# Patient Record
Sex: MALE | Race: WHITE | NOT HISPANIC OR LATINO
[De-identification: names, ages, dates, MRNs, and addresses within clinical notes are randomized per-mention and may not be internally consistent; named-entity substitution may affect disease eponyms.]

---

## 2018-01-09 ENCOUNTER — APPOINTMENT (OUTPATIENT)
Dept: ORTHOPEDIC SURGERY | Facility: CLINIC | Age: 68
End: 2018-01-09

## 2020-06-25 ENCOUNTER — APPOINTMENT (OUTPATIENT)
Dept: UROLOGY | Facility: CLINIC | Age: 70
End: 2020-06-25
Payer: MEDICARE

## 2020-06-25 VITALS
DIASTOLIC BLOOD PRESSURE: 72 MMHG | WEIGHT: 315 LBS | OXYGEN SATURATION: 98 % | SYSTOLIC BLOOD PRESSURE: 162 MMHG | TEMPERATURE: 98 F | BODY MASS INDEX: 44.1 KG/M2 | HEART RATE: 81 BPM | HEIGHT: 71 IN

## 2020-06-25 DIAGNOSIS — E11.9 TYPE 2 DIABETES MELLITUS W/OUT COMPLICATIONS: ICD-10-CM

## 2020-06-25 DIAGNOSIS — Z87.898 PERSONAL HISTORY OF OTHER SPECIFIED CONDITIONS: ICD-10-CM

## 2020-06-25 DIAGNOSIS — Z87.442 PERSONAL HISTORY OF URINARY CALCULI: ICD-10-CM

## 2020-06-25 DIAGNOSIS — Z86.79 PERSONAL HISTORY OF OTHER DISEASES OF THE CIRCULATORY SYSTEM: ICD-10-CM

## 2020-06-25 DIAGNOSIS — Z87.2 PERSONAL HISTORY OF DISEASES OF THE SKIN AND SUBCUTANEOUS TISSUE: ICD-10-CM

## 2020-06-25 DIAGNOSIS — Z86.39 PERSONAL HISTORY OF OTHER ENDOCRINE, NUTRITIONAL AND METABOLIC DISEASE: ICD-10-CM

## 2020-06-25 DIAGNOSIS — Z87.891 PERSONAL HISTORY OF NICOTINE DEPENDENCE: ICD-10-CM

## 2020-06-25 PROCEDURE — 99204 OFFICE O/P NEW MOD 45 MIN: CPT

## 2020-06-25 RX ORDER — AMLODIPINE BESYLATE 5 MG/1
5 TABLET ORAL
Refills: 0 | Status: ACTIVE | COMMUNITY

## 2020-06-25 RX ORDER — GABAPENTIN 600 MG/1
600 TABLET, COATED ORAL
Refills: 0 | Status: ACTIVE | COMMUNITY

## 2020-06-25 RX ORDER — METFORMIN HYDROCHLORIDE 1000 MG/1
1000 TABLET, COATED ORAL
Refills: 0 | Status: ACTIVE | COMMUNITY

## 2020-06-25 RX ORDER — SIMVASTATIN 40 MG/1
40 TABLET, FILM COATED ORAL
Refills: 0 | Status: ACTIVE | COMMUNITY

## 2020-06-25 RX ORDER — FLUTICASONE PROPIONATE 50 UG/1
50 SPRAY, METERED NASAL
Refills: 0 | Status: ACTIVE | COMMUNITY

## 2020-06-25 RX ORDER — LOSARTAN POTASSIUM 100 MG/1
100 TABLET, FILM COATED ORAL
Refills: 0 | Status: ACTIVE | COMMUNITY

## 2020-06-25 RX ORDER — FAMOTIDINE 40 MG/1
40 TABLET, FILM COATED ORAL
Refills: 0 | Status: ACTIVE | COMMUNITY

## 2020-06-25 RX ORDER — METOPROLOL TARTRATE 25 MG/1
25 TABLET, FILM COATED ORAL
Refills: 0 | Status: ACTIVE | COMMUNITY

## 2020-06-25 RX ORDER — GLIMEPIRIDE 2 MG/1
2 TABLET ORAL
Refills: 0 | Status: ACTIVE | COMMUNITY

## 2020-06-25 RX ORDER — OMEPRAZOLE 40 MG/1
40 CAPSULE, DELAYED RELEASE ORAL
Refills: 0 | Status: ACTIVE | COMMUNITY

## 2020-06-25 RX ORDER — CLOPIDOGREL BISULFATE 75 MG/1
75 TABLET, FILM COATED ORAL
Refills: 0 | Status: ACTIVE | COMMUNITY

## 2020-06-25 RX ORDER — HYDROCHLOROTHIAZIDE 25 MG/1
25 TABLET ORAL
Refills: 0 | Status: ACTIVE | COMMUNITY

## 2020-06-25 NOTE — HISTORY OF PRESENT ILLNESS
[FreeTextEntry1] : He is a 69-year-old man who is seen today for initial visit. He has long-standing history of urological issues. Recently, he noticed painless gross hematuria on and off which has stopped. There was no flank pain or dysuria. He quit smoking more than 40 years ago. He is on anticoagulation therapy. A similar situation happened in the last 5 years and he underwent cystoscopy. He was also told CT scan was normal. He has previous history of elevated PSA level and has undergone prostate biopsy. Also he has passed 2 kidney stones. He is on Flomax and finasteride. Nocturia is one or 2 times. Sometimes he has urinary urgency. In June 2020, urine culture was negative, creatinine 0.64. PSA level was 1.6 in December 2019. Residual urine today was minimal.\par Addendum: CT scan without contrast in the Nov 2017 showed bilateral simple renal cysts, no kidney stones, enlarged prostate. CT scan in January 2014 shows bilateral renal cysts and previously seen left UPJ stone was no longer visible. Ultrasound in August 2013 showed small left UVJ stone. Studies were done at [facility].

## 2020-06-25 NOTE — REVIEW OF SYSTEMS
[Shortness Of Breath] : shortness of breath [Feeling Tired] : feeling tired [Diarrhea] : diarrhea [Constipation] : constipation [Heartburn] : heartburn [Joint Pain] : joint pain [Dizziness] : dizziness [Joint Swelling] : joint swelling [Difficulty Walking] : difficulty walking [Limb Weakness] : limb weakness [Negative] : Heme/Lymph [FreeTextEntry3] : sinus problems [FreeTextEntry2] : hypertension [FreeTextEntry5] : no erections [FreeTextEntry4] : hematuria, nocturia, frequency

## 2020-06-25 NOTE — PHYSICAL EXAM
[General Appearance - Well Developed] : well developed [Normal Appearance] : normal appearance [General Appearance - Well Nourished] : well nourished [Well Groomed] : well groomed [General Appearance - In No Acute Distress] : no acute distress [Respiration, Rhythm And Depth] : normal respiratory rhythm and effort [Exaggerated Use Of Accessory Muscles For Inspiration] : no accessory muscle use [Abdomen Tenderness] : non-tender [Abdomen Soft] : soft [Costovertebral Angle Tenderness] : no ~M costovertebral angle tenderness [Urethral Meatus] : meatus normal [Scrotum] : the scrotum was normal [Urinary Bladder Findings] : the bladder was normal on palpation [Testes Tenderness] : no tenderness of the testes [Prostate Tenderness] : the prostate was not tender [Testes Mass (___cm)] : there were no testicular masses [No Prostate Nodules] : no prostate nodules [Prostate Enlarged] : was enlarged [Normal Station and Gait] : the gait and station were normal for the patient's age [No Focal Deficits] : no focal deficits [Oriented To Time, Place, And Person] : oriented to person, place, and time [] : no rash [Affect] : the affect was normal [Mood] : the mood was normal [Not Anxious] : not anxious [Inguinal Lymph Nodes Enlarged Bilaterally] : inguinal [FreeTextEntry1] : Obese

## 2020-06-25 NOTE — ASSESSMENT
[FreeTextEntry1] : PSA level is within normal range, he is aware of the 50% reduction of PSA on finasteride. He seems to be satisfied with urinary symptoms at this time with Flomax and finasteride. He does not have any flank pain. He has previous history of kidney stones. We discussed the difference between microscopic and gross hematuria. Potential benign and malignant urological conditions that can cause hematuria were reviewed. Workup including renal imaging with ultrasonography or CT scan, urine studies and cystoscopy were reviewed. Risks of cystoscopy were discussed. Patient was made aware that despite adequate workup, the cause for hematuria may not be discovered and continued followup is recommended. Patient's questions were answered. He will undergo CT scan. He is not sure if he wants to consider another cystoscopy at this time since the last one was very painful for him. We will discuss the results of his studies on the phone otherwise.\par \par Stew Yoon MD, FACS\par [facility]\par  of Urology\par \par [address], [address]\[place] [address]\par \par [address], [address]\[place] [address]\par \par Tel: [phone]\par Fax: [phone]

## 2020-06-25 NOTE — LETTER BODY
[Consult Letter:] : I had the pleasure of evaluating your patient, [unfilled]. [Dear  ___] : Dear  [unfilled], [Consult Closing:] : Thank you very much for allowing me to participate in the care of this patient.  If you have any questions, please do not hesitate to contact me. [FreeTextEntry1] : [facility]\par [address] \[place], [address] \par [phone] \par

## 2020-06-29 LAB — URINE CYTOLOGY: NORMAL

## 2020-07-10 ENCOUNTER — APPOINTMENT (OUTPATIENT)
Dept: UROLOGY | Facility: CLINIC | Age: 70
End: 2020-07-10
Payer: MEDICARE

## 2020-07-10 VITALS
DIASTOLIC BLOOD PRESSURE: 105 MMHG | SYSTOLIC BLOOD PRESSURE: 176 MMHG | RESPIRATION RATE: 17 BRPM | TEMPERATURE: 96.6 F | HEIGHT: 71 IN | HEART RATE: 98 BPM

## 2020-07-10 PROCEDURE — 52000 CYSTOURETHROSCOPY: CPT

## 2020-07-10 PROCEDURE — 99214 OFFICE O/P EST MOD 30 MIN: CPT | Mod: 25

## 2020-07-10 NOTE — ASSESSMENT
[FreeTextEntry1] : he quit smoking a long time ago but used to smoke 2 ppd. Cystoscopy showed a papillary but relatively flat area on the right side of the bladder (as seen on CT), suspicious of urothelial carcinoma. Urine cytology was normal. He has a very long prostate on office cystoscopy. Discussed TURBT in hospital setting in detail. he needs to be cleared to stop Plavix at least 3-4 days before the procedure. discussed risks including inability to fully resect tumor, bladder perforation, return to OR, secondary treatments, etc. Also discussed it with his wife. Will schedule in the near future at [facility]. \par \par Stew Yoon MD, FACS\par [facility]\par  of Urology\par [address] [place] [address], [address]\[place] [address]\par Tel: [phone]\par Fax: [phone]\par

## 2020-07-10 NOTE — LETTER BODY
[Dear  ___] : Dear  [unfilled], [Consult Closing:] : Thank you very much for allowing me to participate in the care of this patient.  If you have any questions, please do not hesitate to contact me. [Consult Letter:] : I had the pleasure of evaluating your patient, [unfilled]. [FreeTextEntry1] : [facility]\par [address] \[place], [address] \par [phone] \par

## 2020-07-10 NOTE — PHYSICAL EXAM
[Normal Appearance] : normal appearance [General Appearance - Well Nourished] : well nourished [General Appearance - Well Developed] : well developed [Well Groomed] : well groomed [General Appearance - In No Acute Distress] : no acute distress [Abdomen Soft] : soft [Costovertebral Angle Tenderness] : no ~M costovertebral angle tenderness [Abdomen Tenderness] : non-tender [Testes Tenderness] : no tenderness of the testes [Urethral Meatus] : meatus normal [Scrotum] : the scrotum was normal [Urinary Bladder Findings] : the bladder was normal on palpation [No Prostate Nodules] : no prostate nodules [Prostate Tenderness] : the prostate was not tender [Testes Mass (___cm)] : there were no testicular masses [Prostate Enlarged] : was enlarged [FreeTextEntry1] : MICHOACANO done 7/2020 [] : no respiratory distress [Respiration, Rhythm And Depth] : normal respiratory rhythm and effort [Affect] : the affect was normal [Exaggerated Use Of Accessory Muscles For Inspiration] : no accessory muscle use [Oriented To Time, Place, And Person] : oriented to person, place, and time [Not Anxious] : not anxious [Mood] : the mood was normal

## 2020-07-10 NOTE — HISTORY OF PRESENT ILLNESS
[FreeTextEntry1] : He is a 69-year-old man who is seen today in follow up for cystoscopy. Urine cytology was normal. CT on 6/2020 from [facility] showed right bladder wall thickening, large prostate and bilateral renal stones. He is on Plavix. \par \par Previous notes: He has long-standing history of urological issues. Recently, he noticed painless gross hematuria on and off which has stopped. There was no flank pain or dysuria. He quit smoking more than 40 years ago. He is on anticoagulation therapy. A similar situation happened in the last 5 years and he underwent cystoscopy. He was also told CT scan was normal. He has previous history of elevated PSA level and has undergone prostate biopsy. Also he has passed 2 kidney stones. He is on Flomax and finasteride. Nocturia is one or 2 times. Sometimes he has urinary urgency. In June 2020, urine culture was negative, creatinine 0.64. PSA level was 1.6 in December 2019. Residual urine was minimal.\par Addendum: CT scan without contrast in the Nov 2017 showed bilateral simple renal cysts, no kidney stones, enlarged prostate. CT scan in January 2014 shows bilateral renal cysts and previously seen left UPJ stone was no longer visible. Ultrasound in August 2013 showed small left UVJ stone. Studies were done at [facility].

## 2020-07-28 ENCOUNTER — OUTPATIENT (OUTPATIENT)
Dept: OUTPATIENT SERVICES | Facility: HOSPITAL | Age: 70
LOS: 1 days | End: 2020-07-28
Payer: COMMERCIAL

## 2020-07-28 VITALS
HEART RATE: 95 BPM | DIASTOLIC BLOOD PRESSURE: 73 MMHG | TEMPERATURE: 99 F | WEIGHT: 307.1 LBS | RESPIRATION RATE: 14 BRPM | SYSTOLIC BLOOD PRESSURE: 138 MMHG | OXYGEN SATURATION: 97 % | HEIGHT: 70 IN

## 2020-07-28 DIAGNOSIS — K22.0 ACHALASIA OF CARDIA: Chronic | ICD-10-CM

## 2020-07-28 DIAGNOSIS — R31.0 GROSS HEMATURIA: ICD-10-CM

## 2020-07-28 DIAGNOSIS — Z01.818 ENCOUNTER FOR OTHER PREPROCEDURAL EXAMINATION: ICD-10-CM

## 2020-07-28 DIAGNOSIS — Z98.52 VASECTOMY STATUS: Chronic | ICD-10-CM

## 2020-07-28 DIAGNOSIS — Z98.890 OTHER SPECIFIED POSTPROCEDURAL STATES: Chronic | ICD-10-CM

## 2020-07-28 DIAGNOSIS — K60.3 ANAL FISTULA: Chronic | ICD-10-CM

## 2020-07-28 DIAGNOSIS — G47.33 OBSTRUCTIVE SLEEP APNEA (ADULT) (PEDIATRIC): ICD-10-CM

## 2020-07-28 DIAGNOSIS — E66.9 OBESITY, UNSPECIFIED: ICD-10-CM

## 2020-07-28 DIAGNOSIS — E11.9 TYPE 2 DIABETES MELLITUS WITHOUT COMPLICATIONS: ICD-10-CM

## 2020-07-28 DIAGNOSIS — D49.4 NEOPLASM OF UNSPECIFIED BEHAVIOR OF BLADDER: ICD-10-CM

## 2020-07-28 LAB
ANION GAP SERPL CALC-SCNC: 10 MMOL/L — SIGNIFICANT CHANGE UP (ref 5–17)
BUN SERPL-MCNC: 16 MG/DL — SIGNIFICANT CHANGE UP (ref 7–23)
CALCIUM SERPL-MCNC: 9.9 MG/DL — SIGNIFICANT CHANGE UP (ref 8.4–10.5)
CHLORIDE SERPL-SCNC: 105 MMOL/L — SIGNIFICANT CHANGE UP (ref 96–108)
CO2 SERPL-SCNC: 26 MMOL/L — SIGNIFICANT CHANGE UP (ref 22–31)
CREAT SERPL-MCNC: 0.66 MG/DL — SIGNIFICANT CHANGE UP (ref 0.5–1.3)
GLUCOSE SERPL-MCNC: 139 MG/DL — HIGH (ref 70–99)
HCT VFR BLD CALC: 40.5 % — SIGNIFICANT CHANGE UP (ref 39–50)
HGB BLD-MCNC: 12.5 G/DL — LOW (ref 13–17)
MCHC RBC-ENTMCNC: 27.8 PG — SIGNIFICANT CHANGE UP (ref 27–34)
MCHC RBC-ENTMCNC: 30.9 GM/DL — LOW (ref 32–36)
MCV RBC AUTO: 90.2 FL — SIGNIFICANT CHANGE UP (ref 80–100)
NRBC # BLD: 0 /100 WBCS — SIGNIFICANT CHANGE UP (ref 0–0)
PLATELET # BLD AUTO: 297 K/UL — SIGNIFICANT CHANGE UP (ref 150–400)
POTASSIUM SERPL-MCNC: 5.1 MMOL/L — SIGNIFICANT CHANGE UP (ref 3.5–5.3)
POTASSIUM SERPL-SCNC: 5.1 MMOL/L — SIGNIFICANT CHANGE UP (ref 3.5–5.3)
RBC # BLD: 4.49 M/UL — SIGNIFICANT CHANGE UP (ref 4.2–5.8)
RBC # FLD: 14.4 % — SIGNIFICANT CHANGE UP (ref 10.3–14.5)
SODIUM SERPL-SCNC: 141 MMOL/L — SIGNIFICANT CHANGE UP (ref 135–145)
WBC # BLD: 8.25 K/UL — SIGNIFICANT CHANGE UP (ref 3.8–10.5)
WBC # FLD AUTO: 8.25 K/UL — SIGNIFICANT CHANGE UP (ref 3.8–10.5)

## 2020-07-28 PROCEDURE — 85027 COMPLETE CBC AUTOMATED: CPT

## 2020-07-28 PROCEDURE — 87086 URINE CULTURE/COLONY COUNT: CPT

## 2020-07-28 PROCEDURE — 83036 HEMOGLOBIN GLYCOSYLATED A1C: CPT

## 2020-07-28 PROCEDURE — 80048 BASIC METABOLIC PNL TOTAL CA: CPT

## 2020-07-28 PROCEDURE — G0463: CPT

## 2020-07-28 RX ORDER — GLIMEPIRIDE 1 MG
1 TABLET ORAL
Qty: 0 | Refills: 0 | DISCHARGE

## 2020-07-28 RX ORDER — CEFAZOLIN SODIUM 1 G
3000 VIAL (EA) INJECTION ONCE
Refills: 0 | Status: DISCONTINUED | OUTPATIENT
Start: 2020-08-04 | End: 2020-08-19

## 2020-07-28 NOTE — H&P PST ADULT - HISTORY OF PRESENT ILLNESS
70 y/o male with PMHx of HTN, BPH, T2DM, GERD, DESTINI (Bipap), CVA (2011) on Plavix, had few episodes of gross hematuria and went to PCP. Pt was referred 70 y/o obese male with PMHx of HTN, BPH, T2DM, GERD, DESTINI (Bipap), CVA (2011) on Plavix, had few episodes of gross hematuria and went to PCP. Pt was referred to Dr. Yoon, Diagnostic imaging revealed thickening of right lateral bladder. Presents to PST for a scheduled cystoscopy, bipolar transurethral resection of bladder tumor on 8/4/2020.     ***Of note, pt to hold Plavix 5 days pre-op per Dr. Yoon, last dose 7/29. Recommended low-dose aspirin to pt while off Plavix- pt to follow-up with PCP tomorrow to confirm pre-op plan  ***Covid swab scheduled 8/1 ([place])

## 2020-07-28 NOTE — H&P PST ADULT - NSICDXPASTSURGICALHX_GEN_ALL_CORE_FT
PAST SURGICAL HISTORY:  Achalasia of esophogus s/p POEM procedure 2/2016    Anal fistula repair    H/O colonoscopy     H/O vasectomy PAST SURGICAL HISTORY:  Achalasia of esophagus s/p POEM procedure 2/2016    Anal fistula repair    H/O colonoscopy     H/O vasectomy

## 2020-07-28 NOTE — H&P PST ADULT - OTHER CARE PROVIDERS
[facility] ([place]) Angel Vallejo [phone] [facility] ([place]) Angel Vallejo [phone], appt 7/30

## 2020-07-28 NOTE — H&P PST ADULT - NSICDXPROBLEM_GEN_ALL_CORE_FT
PROBLEM DIAGNOSES  Problem: Bladder tumor  Assessment and Plan: Scheduled cystoscopy, TURBT 8/4/2020  Pre-op instructions given to pt, labwork, UCx sent at [facility]  Covid swab scheduled 8/1  Pt to continue BP medications pre-op  Will obtain PCP, Cardiac Eval/EKG    Problem: DESTINI treated with BiPAP  Assessment and Plan: OR booking notified     Problem: T2DM (type 2 diabetes mellitus)  Assessment and Plan: Pt to hold Metformin and Glimepiride 24 hrs pre-op, last dose 8/4am  HGa1c sent at [facility]  FS DOS    Problem: Obese  Assessment and Plan: Bariatric team aware

## 2020-07-28 NOTE — H&P PST ADULT - NSICDXPASTMEDICALHX_GEN_ALL_CORE_FT
PAST MEDICAL HISTORY:  Acne rosacea     Bladder tumor     Cerebrovascular accident (CVA) 9/2011 on Plavix    Diabetic neuropathy feet    Enlarged prostate     HTN (hypertension)     DESTINI treated with BiPAP 18/14    S/P cataract surgery     T2DM (type 2 diabetes mellitus) PAST MEDICAL HISTORY:  Acne rosacea     Bladder tumor     Cerebrovascular accident (CVA) 9/2011 on Plavix    Diabetic neuropathy feet    Enlarged prostate     GERD (gastroesophageal reflux disease)     HLD (hyperlipidemia)     HTN (hypertension)     Obese BMI 44.1    DESTINI treated with BiPAP 18/14    S/P cataract surgery     T2DM (type 2 diabetes mellitus)

## 2020-07-29 LAB
A1C WITH ESTIMATED AVERAGE GLUCOSE RESULT: 6.4 % — HIGH (ref 4–5.6)
CULTURE RESULTS: SIGNIFICANT CHANGE UP
ESTIMATED AVERAGE GLUCOSE: 137 MG/DL — HIGH (ref 68–114)
SPECIMEN SOURCE: SIGNIFICANT CHANGE UP

## 2020-08-01 ENCOUNTER — APPOINTMENT (OUTPATIENT)
Dept: DISASTER EMERGENCY | Facility: CLINIC | Age: 70
End: 2020-08-01

## 2020-08-02 LAB — SARS-COV-2 N GENE NPH QL NAA+PROBE: NOT DETECTED

## 2020-08-03 ENCOUNTER — TRANSCRIPTION ENCOUNTER (OUTPATIENT)
Age: 70
End: 2020-08-03

## 2020-08-04 ENCOUNTER — APPOINTMENT (OUTPATIENT)
Dept: UROLOGY | Facility: HOSPITAL | Age: 70
End: 2020-08-04

## 2020-08-04 ENCOUNTER — RESULT REVIEW (OUTPATIENT)
Age: 70
End: 2020-08-04

## 2020-08-04 ENCOUNTER — OUTPATIENT (OUTPATIENT)
Dept: OUTPATIENT SERVICES | Facility: HOSPITAL | Age: 70
LOS: 1 days | End: 2020-08-04
Payer: COMMERCIAL

## 2020-08-04 VITALS
SYSTOLIC BLOOD PRESSURE: 115 MMHG | DIASTOLIC BLOOD PRESSURE: 66 MMHG | HEART RATE: 70 BPM | TEMPERATURE: 97 F | RESPIRATION RATE: 16 BRPM | OXYGEN SATURATION: 100 %

## 2020-08-04 VITALS
RESPIRATION RATE: 18 BRPM | HEART RATE: 82 BPM | SYSTOLIC BLOOD PRESSURE: 115 MMHG | TEMPERATURE: 98 F | OXYGEN SATURATION: 98 % | DIASTOLIC BLOOD PRESSURE: 91 MMHG | HEIGHT: 70 IN | WEIGHT: 307.1 LBS

## 2020-08-04 DIAGNOSIS — K22.0 ACHALASIA OF CARDIA: Chronic | ICD-10-CM

## 2020-08-04 DIAGNOSIS — Z98.890 OTHER SPECIFIED POSTPROCEDURAL STATES: Chronic | ICD-10-CM

## 2020-08-04 DIAGNOSIS — R31.0 GROSS HEMATURIA: ICD-10-CM

## 2020-08-04 DIAGNOSIS — Z98.52 VASECTOMY STATUS: Chronic | ICD-10-CM

## 2020-08-04 DIAGNOSIS — K60.3 ANAL FISTULA: Chronic | ICD-10-CM

## 2020-08-04 LAB — GLUCOSE BLDC GLUCOMTR-MCNC: 179 MG/DL — HIGH (ref 70–99)

## 2020-08-04 PROCEDURE — 82962 GLUCOSE BLOOD TEST: CPT

## 2020-08-04 PROCEDURE — 88305 TISSUE EXAM BY PATHOLOGIST: CPT | Mod: 26

## 2020-08-04 PROCEDURE — 88305 TISSUE EXAM BY PATHOLOGIST: CPT

## 2020-08-04 PROCEDURE — 52234 CYSTOSCOPY AND TREATMENT: CPT

## 2020-08-04 RX ORDER — CLOPIDOGREL BISULFATE 75 MG/1
1 TABLET, FILM COATED ORAL
Qty: 0 | Refills: 0 | DISCHARGE

## 2020-08-04 RX ORDER — GLIMEPIRIDE 1 MG
1 TABLET ORAL
Qty: 0 | Refills: 0 | DISCHARGE

## 2020-08-04 RX ORDER — SIMVASTATIN 20 MG/1
1 TABLET, FILM COATED ORAL
Qty: 0 | Refills: 0 | DISCHARGE

## 2020-08-04 RX ORDER — LIDOCAINE HCL 20 MG/ML
0.2 VIAL (ML) INJECTION ONCE
Refills: 0 | Status: DISCONTINUED | OUTPATIENT
Start: 2020-08-04 | End: 2020-08-04

## 2020-08-04 RX ORDER — HYDROMORPHONE HYDROCHLORIDE 2 MG/ML
0.25 INJECTION INTRAMUSCULAR; INTRAVENOUS; SUBCUTANEOUS
Refills: 0 | Status: DISCONTINUED | OUTPATIENT
Start: 2020-08-04 | End: 2020-08-04

## 2020-08-04 RX ORDER — LOSARTAN POTASSIUM 100 MG/1
1 TABLET, FILM COATED ORAL
Qty: 0 | Refills: 0 | DISCHARGE

## 2020-08-04 RX ORDER — FINASTERIDE 5 MG/1
1 TABLET, FILM COATED ORAL
Qty: 0 | Refills: 0 | DISCHARGE

## 2020-08-04 RX ORDER — HALOPERIDOL DECANOATE 100 MG/ML
1 INJECTION INTRAMUSCULAR ONCE
Refills: 0 | Status: DISCONTINUED | OUTPATIENT
Start: 2020-08-04 | End: 2020-08-19

## 2020-08-04 RX ORDER — HYDROMORPHONE HYDROCHLORIDE 2 MG/ML
0.5 INJECTION INTRAMUSCULAR; INTRAVENOUS; SUBCUTANEOUS
Refills: 0 | Status: DISCONTINUED | OUTPATIENT
Start: 2020-08-04 | End: 2020-08-04

## 2020-08-04 RX ORDER — OMEPRAZOLE 10 MG/1
1 CAPSULE, DELAYED RELEASE ORAL
Qty: 0 | Refills: 0 | DISCHARGE

## 2020-08-04 RX ORDER — ACETAMINOPHEN 500 MG
2 TABLET ORAL
Qty: 0 | Refills: 0 | DISCHARGE

## 2020-08-04 RX ORDER — FAMOTIDINE 10 MG/ML
1 INJECTION INTRAVENOUS
Qty: 0 | Refills: 0 | DISCHARGE

## 2020-08-04 RX ORDER — SODIUM CHLORIDE 9 MG/ML
3 INJECTION INTRAMUSCULAR; INTRAVENOUS; SUBCUTANEOUS EVERY 8 HOURS
Refills: 0 | Status: DISCONTINUED | OUTPATIENT
Start: 2020-08-04 | End: 2020-08-04

## 2020-08-04 RX ORDER — METOPROLOL TARTRATE 50 MG
1 TABLET ORAL
Qty: 0 | Refills: 0 | DISCHARGE

## 2020-08-04 RX ORDER — METFORMIN HYDROCHLORIDE 850 MG/1
1 TABLET ORAL
Qty: 0 | Refills: 0 | DISCHARGE

## 2020-08-04 RX ORDER — TAMSULOSIN HYDROCHLORIDE 0.4 MG/1
1 CAPSULE ORAL
Qty: 0 | Refills: 0 | DISCHARGE

## 2020-08-04 RX ORDER — GABAPENTIN 400 MG/1
2 CAPSULE ORAL
Qty: 0 | Refills: 0 | DISCHARGE

## 2020-08-04 RX ORDER — AMLODIPINE BESYLATE 2.5 MG/1
1 TABLET ORAL
Qty: 0 | Refills: 0 | DISCHARGE

## 2020-08-04 RX ORDER — CEPHALEXIN 500 MG
1 CAPSULE ORAL
Qty: 6 | Refills: 0
Start: 2020-08-04 | End: 2020-08-06

## 2020-08-04 NOTE — ASU DISCHARGE PLAN (ADULT/PEDIATRIC) - CARE PROVIDER_API CALL
Stew Yoon)  Urology  [address] [place] [address]  Phone: [phone]  Fax: [phone]  Follow Up Time:

## 2020-08-04 NOTE — ASU DISCHARGE PLAN (ADULT/PEDIATRIC) - ASU DC SPECIAL INSTRUCTIONSFT
You may take over the counter pain medicine as needed for pain.    Please complete course of antibiotics as prescribed.     You may have intermittent blood tinged urine. This is normal and due to the procedure. If your urine becomes bright red or with clots, please call the office.     .Please followup with Dr Yoon on Friday for escobar removal. His office will call you to confirm an appointment time.

## 2020-08-04 NOTE — ASU DISCHARGE PLAN (ADULT/PEDIATRIC) - CALL YOUR DOCTOR IF YOU HAVE ANY OF THE FOLLOWING:
Unable to urinate/Pain not relieved by Medications/Fever greater than (need to indicate Fahrenheit or Celsius)/Bleeding that does not stop

## 2020-08-05 PROBLEM — N40.0 BENIGN PROSTATIC HYPERPLASIA WITHOUT LOWER URINARY TRACT SYMPTOMS: Chronic | Status: ACTIVE | Noted: 2020-07-28

## 2020-08-05 PROBLEM — E78.5 HYPERLIPIDEMIA, UNSPECIFIED: Chronic | Status: ACTIVE | Noted: 2020-07-28

## 2020-08-05 PROBLEM — E11.40 TYPE 2 DIABETES MELLITUS WITH DIABETIC NEUROPATHY, UNSPECIFIED: Chronic | Status: ACTIVE | Noted: 2020-07-28

## 2020-08-05 PROBLEM — L71.9 ROSACEA, UNSPECIFIED: Chronic | Status: ACTIVE | Noted: 2020-07-28

## 2020-08-05 PROBLEM — K21.9 GASTRO-ESOPHAGEAL REFLUX DISEASE WITHOUT ESOPHAGITIS: Chronic | Status: ACTIVE | Noted: 2020-07-28

## 2020-08-05 PROBLEM — Z98.49 CATARACT EXTRACTION STATUS, UNSPECIFIED EYE: Chronic | Status: ACTIVE | Noted: 2020-07-28

## 2020-08-05 PROBLEM — I10 ESSENTIAL (PRIMARY) HYPERTENSION: Chronic | Status: ACTIVE | Noted: 2020-07-28

## 2020-08-05 PROBLEM — E66.9 OBESITY, UNSPECIFIED: Chronic | Status: ACTIVE | Noted: 2020-07-28

## 2020-08-05 PROBLEM — G47.33 OBSTRUCTIVE SLEEP APNEA (ADULT) (PEDIATRIC): Chronic | Status: ACTIVE | Noted: 2020-07-28

## 2020-08-05 PROBLEM — D49.4 NEOPLASM OF UNSPECIFIED BEHAVIOR OF BLADDER: Chronic | Status: ACTIVE | Noted: 2020-07-28

## 2020-08-05 PROBLEM — E11.9 TYPE 2 DIABETES MELLITUS WITHOUT COMPLICATIONS: Chronic | Status: ACTIVE | Noted: 2020-07-28

## 2020-08-05 PROBLEM — I63.9 CEREBRAL INFARCTION, UNSPECIFIED: Chronic | Status: ACTIVE | Noted: 2020-07-28

## 2020-08-05 LAB — SURGICAL PATHOLOGY STUDY: SIGNIFICANT CHANGE UP

## 2020-08-07 ENCOUNTER — APPOINTMENT (OUTPATIENT)
Dept: UROLOGY | Facility: CLINIC | Age: 70
End: 2020-08-07
Payer: MEDICARE

## 2020-08-07 PROCEDURE — 99213 OFFICE O/P EST LOW 20 MIN: CPT

## 2020-08-07 NOTE — HISTORY OF PRESENT ILLNESS
[FreeTextEntry1] : S/P TURBT, Cysto 8/4. Feeling well. Denies fever, chills. Here today to have escobar catheter discontinued. [None] : None

## 2020-08-07 NOTE — PHYSICAL EXAM
[Normal Appearance] : normal appearance [Well Groomed] : well groomed [General Appearance - Well Developed] : well developed [General Appearance - Well Nourished] : well nourished [FreeTextEntry1] : escobar with yellow/orange urine, no hematuria or clots [General Appearance - In No Acute Distress] : no acute distress [Exaggerated Use Of Accessory Muscles For Inspiration] : no accessory muscle use [] : no respiratory distress [Respiration, Rhythm And Depth] : normal respiratory rhythm and effort [Skin Color & Pigmentation] : normal skin color and pigmentation [Oriented To Time, Place, And Person] : oriented to person, place, and time [Mood] : the mood was normal [Affect] : the affect was normal [Normal Station and Gait] : the gait and station were normal for the patient's age [Not Anxious] : not anxious

## 2020-08-07 NOTE — ASSESSMENT
[FreeTextEntry1] : Post Op\par \par S/P TURBT x 4 days\par Reyes catheter discontinued\par Instructed if difficulty voiding notify office or to ED\par As per pt next appt. is in 3 months

## 2020-08-26 ENCOUNTER — APPOINTMENT (OUTPATIENT)
Dept: UROLOGY | Facility: CLINIC | Age: 70
End: 2020-08-26
Payer: MEDICARE

## 2020-08-26 VITALS
SYSTOLIC BLOOD PRESSURE: 183 MMHG | RESPIRATION RATE: 16 BRPM | DIASTOLIC BLOOD PRESSURE: 85 MMHG | TEMPERATURE: 97.3 F | HEART RATE: 106 BPM

## 2020-08-26 PROCEDURE — 51700 IRRIGATION OF BLADDER: CPT

## 2020-09-11 ENCOUNTER — APPOINTMENT (OUTPATIENT)
Dept: UROLOGY | Facility: CLINIC | Age: 70
End: 2020-09-11
Payer: MEDICARE

## 2020-09-11 VITALS — HEIGHT: 71 IN | WEIGHT: 315 LBS | TEMPERATURE: 97.3 F | BODY MASS INDEX: 44.1 KG/M2 | RESPIRATION RATE: 17 BRPM

## 2020-09-11 PROCEDURE — 99214 OFFICE O/P EST MOD 30 MIN: CPT

## 2020-09-11 RX ORDER — TAMSULOSIN HYDROCHLORIDE 0.4 MG/1
0.4 CAPSULE ORAL
Qty: 180 | Refills: 1 | Status: ACTIVE | COMMUNITY
Start: 1900-01-01 | End: 1900-01-01

## 2020-09-11 NOTE — PHYSICAL EXAM
[General Appearance - Well Developed] : well developed [General Appearance - Well Nourished] : well nourished [Well Groomed] : well groomed [Normal Appearance] : normal appearance [General Appearance - In No Acute Distress] : no acute distress [Abdomen Tenderness] : non-tender [Abdomen Soft] : soft [Costovertebral Angle Tenderness] : no ~M costovertebral angle tenderness [Urethral Meatus] : meatus normal [Scrotum] : the scrotum was normal [Urinary Bladder Findings] : the bladder was normal on palpation [Testes Tenderness] : no tenderness of the testes [Testes Mass (___cm)] : there were no testicular masses [Prostate Tenderness] : the prostate was not tender [No Prostate Nodules] : no prostate nodules [Prostate Enlarged] : was enlarged [] : no respiratory distress [Respiration, Rhythm And Depth] : normal respiratory rhythm and effort [Exaggerated Use Of Accessory Muscles For Inspiration] : no accessory muscle use [FreeTextEntry1] : using a cane [Inguinal Lymph Nodes Enlarged Bilaterally] : inguinal

## 2020-09-11 NOTE — HISTORY OF PRESENT ILLNESS
[FreeTextEntry1] : He is a 69-year-old man who is seen today in follow-up. He underwent resection of an anterior bladder lesion on August 4, 2020. 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 About 2 weeks after the catheter was removed he developed gross hematuria and went to [facility]. He required catheterization and bladder irrigation overnight. Now catheter has been removed. He has a small amount of blood in the urine on and off. He is on Plavix. He also has history of urethral stricture and urinary stream is split. He has pain with urination. Residual urine today was minimal. CT scan on August 23, 2020 from emergency room showed bladder filling defects dependently representing blood clots, mild bilateral hydronephrosis and distended bladder, diverticulosis, gallstones. Prior to surgery urine cytology was normal. CT on 6/2020 from [facility] showed right bladder wall thickening, large prostate and bilateral renal stones.\par \par Previous notes: He has long-standing history of urological issues. He quit smoking more than 40 years ago. He has previous history of elevated PSA level and has undergone prostate biopsy. Also he has passed 2 kidney stones. He is on Flomax and finasteride. Nocturia is one or 2 times. Sometimes he has urinary urgency. In June 2020, urine culture was negative, creatinine 0.64. PSA level was 1.6 in December 2019. Residual urine was minimal.\par Addendum: CT scan without contrast in the Nov 2017 showed bilateral simple renal cysts, no kidney stones, enlarged prostate. CT scan in January 2014 shows bilateral renal cysts and previously seen left UPJ stone was no longer visible. Ultrasound in August 2013 showed small left UVJ stone. Studies were done at [facility].

## 2020-09-11 NOTE — ASSESSMENT
[FreeTextEntry1] : Residual urine is minimal. Urine will be sent for culture. Increase Flomax to twice a day. CT scan was reviewed. Also he can use Pyridium for dysuria. Pathology report was discussed in detail. He should consider close followup with repeat office cystoscopy in 3 months. If urinary symptoms worsen, he will return for self dilation and catheterization of distal urethral strictures in the office.\par \par Stew Yoon MD, FACS\par [facility]\par  of Urology\par \par [address], [address]\[place] [address]\par \par [address], [address]\[place] [address]\par \par Tel: [phone]\par Fax: [phone]

## 2020-09-11 NOTE — LETTER BODY
[Dear  ___] : Dear  [unfilled], [Consult Letter:] : I had the pleasure of evaluating your patient, [unfilled]. [Consult Closing:] : Thank you very much for allowing me to participate in the care of this patient.  If you have any questions, please do not hesitate to contact me. [FreeTextEntry1] : [facility]\par [address] \[place], [address] \par [phone] \par

## 2020-09-16 LAB — BACTERIA UR CULT: ABNORMAL

## 2020-09-16 RX ORDER — FLUCONAZOLE 150 MG/1
150 TABLET ORAL
Qty: 3 | Refills: 0 | Status: ACTIVE | COMMUNITY
Start: 2020-09-16 | End: 1900-01-01

## 2020-09-25 ENCOUNTER — APPOINTMENT (OUTPATIENT)
Dept: UROLOGY | Facility: CLINIC | Age: 70
End: 2020-09-25

## 2020-09-25 ENCOUNTER — APPOINTMENT (OUTPATIENT)
Dept: UROLOGY | Facility: CLINIC | Age: 70
End: 2020-09-25
Payer: MEDICARE

## 2020-09-25 VITALS
SYSTOLIC BLOOD PRESSURE: 178 MMHG | HEART RATE: 98 BPM | BODY MASS INDEX: 42.56 KG/M2 | WEIGHT: 304 LBS | TEMPERATURE: 97.4 F | DIASTOLIC BLOOD PRESSURE: 85 MMHG | HEIGHT: 71 IN

## 2020-09-25 LAB
BILIRUB UR QL STRIP: NEGATIVE
CLARITY UR: CLEAR
COLLECTION METHOD: NORMAL
GLUCOSE UR-MCNC: NEGATIVE
HCG UR QL: 0.2 EU/DL
HGB UR QL STRIP.AUTO: NORMAL
KETONES UR-MCNC: NEGATIVE
LEUKOCYTE ESTERASE UR QL STRIP: NORMAL
NITRITE UR QL STRIP: NEGATIVE
PH UR STRIP: 6.5
PROT UR STRIP-MCNC: NEGATIVE
SP GR UR STRIP: 1.01

## 2020-09-25 PROCEDURE — 81003 URINALYSIS AUTO W/O SCOPE: CPT | Mod: QW

## 2020-09-25 PROCEDURE — 51741 ELECTRO-UROFLOWMETRY FIRST: CPT

## 2020-09-25 PROCEDURE — 99213 OFFICE O/P EST LOW 20 MIN: CPT | Mod: 25

## 2020-09-25 RX ORDER — DOXYCYCLINE HYCLATE 100 MG/1
100 CAPSULE ORAL
Refills: 0 | Status: DISCONTINUED | COMMUNITY
End: 2020-09-25

## 2020-09-25 RX ORDER — PHENAZOPYRIDINE 200 MG/1
200 TABLET, FILM COATED ORAL
Qty: 16 | Refills: 5 | Status: ACTIVE | COMMUNITY
Start: 2020-09-11 | End: 1900-01-01

## 2020-09-25 NOTE — HISTORY OF PRESENT ILLNESS
[FreeTextEntry1] : He is a 69-year-old man who is seen today in follow-up. Urine culture in September 2020 showed Candida and he was treated with Diflucan. Urinary symptoms have improved somewhat but he still has on and off blood in the urine. Uroflow today showed maximum 11, average 6 mL per second, voided 170 cc and residual urine was 50 cc. He also has a distal urethral stricture. He underwent anterior bladder lesion resection on August 4, 2020. 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 He is on finasteride and Flomax twice a day. Pyridium helped with dysuria.\par \par About 2 weeks after the catheter was removed he developed gross hematuria and went to [facility]. He required catheterization and bladder irrigation overnight. He is on Plavix. CT scan on August 23, 2020 from emergency room showed bladder filling defects dependently representing blood clots, mild bilateral hydronephrosis and distended bladder, diverticulosis, gallstones. Prior to surgery urine cytology was normal. CT on 6/2020 from [facility] showed right bladder wall thickening, large prostate and bilateral renal stones.\par \par Previous notes: He has long-standing history of urological issues. He quit smoking more than 40 years ago. He has previous history of elevated PSA level and has undergone prostate biopsy. Also he has passed 2 kidney stones. He is on Flomax and finasteride. In June 2020, urine culture was negative, creatinine 0.64. PSA level was 1.6 in December 2019. \par Addendum: CT scan without contrast in the Nov 2017 showed bilateral simple renal cysts, no kidney stones, enlarged prostate. CT scan in January 2014 shows bilateral renal cysts and previously seen left UPJ stone was no longer visible. Ultrasound in August 2013 showed small left UVJ stone. Studies were done at [facility].

## 2020-09-25 NOTE — PHYSICAL EXAM
[General Appearance - Well Developed] : well developed [General Appearance - Well Nourished] : well nourished [Normal Appearance] : normal appearance [Well Groomed] : well groomed [General Appearance - In No Acute Distress] : no acute distress [Abdomen Soft] : soft [Abdomen Tenderness] : non-tender [Costovertebral Angle Tenderness] : no ~M costovertebral angle tenderness [Urethral Meatus] : meatus normal [Urinary Bladder Findings] : the bladder was normal on palpation [Scrotum] : the scrotum was normal [Testes Tenderness] : no tenderness of the testes [Testes Mass (___cm)] : there were no testicular masses [Prostate Tenderness] : the prostate was not tender [No Prostate Nodules] : no prostate nodules [Prostate Enlarged] : was enlarged [FreeTextEntry1] : MICHOACANO done 7/2020. [] : no respiratory distress [Respiration, Rhythm And Depth] : normal respiratory rhythm and effort [Exaggerated Use Of Accessory Muscles For Inspiration] : no accessory muscle use [Oriented To Time, Place, And Person] : oriented to person, place, and time [Affect] : the affect was normal [Mood] : the mood was normal [Not Anxious] : not anxious

## 2020-09-25 NOTE — ASSESSMENT
[FreeTextEntry1] : Urine culture will be repeated. Residual urine was acceptable. In the office with a 14 Wolof catheter distal urethral stricture was dilated. Uroflow was done after he was dilated. He will followup in 3 months for repeat cystoscopy unless his symptoms worsen.\par \par Stew Yoon MD, FACS\par [facility]\par  of Urology\par \par [address], [address]\[place] [address]\par \par [address]\[place] [address]\par \par Tel: [phone]\par Fax: [phone]

## 2020-09-25 NOTE — LETTER BODY
[Dear  ___] : Dear  [unfilled], [Consult Letter:] : I had the pleasure of evaluating your patient, [unfilled]. [Consult Closing:] : Thank you very much for allowing me to participate in the care of this patient.  If you have any questions, please do not hesitate to contact me. [FreeTextEntry1] : [facility]\par [address] \[place], [address] \par [phone] \par

## 2020-09-27 LAB — BACTERIA UR CULT: NORMAL

## 2020-10-07 ENCOUNTER — TRANSCRIPTION ENCOUNTER (OUTPATIENT)
Age: 70
End: 2020-10-07

## 2020-10-09 ENCOUNTER — APPOINTMENT (OUTPATIENT)
Dept: UROLOGY | Facility: CLINIC | Age: 70
End: 2020-10-09
Payer: MEDICARE

## 2020-10-09 DIAGNOSIS — Z87.448 PERSONAL HISTORY OF OTHER DISEASES OF URINARY SYSTEM: ICD-10-CM

## 2020-10-09 DIAGNOSIS — N40.0 BENIGN PROSTATIC HYPERPLASIA WITHOUT LOWER URINARY TRACT SYMPMS: ICD-10-CM

## 2020-10-09 PROCEDURE — 99213 OFFICE O/P EST LOW 20 MIN: CPT

## 2020-10-09 RX ORDER — FINASTERIDE 5 MG/1
5 TABLET, FILM COATED ORAL DAILY
Qty: 90 | Refills: 2 | Status: ACTIVE | COMMUNITY
Start: 1900-01-01 | End: 1900-01-01

## 2020-10-09 NOTE — PHYSICAL EXAM
[General Appearance - Well Developed] : well developed [General Appearance - Well Nourished] : well nourished [Normal Appearance] : normal appearance [Well Groomed] : well groomed [General Appearance - In No Acute Distress] : no acute distress [Abdomen Soft] : soft [Abdomen Tenderness] : non-tender [Costovertebral Angle Tenderness] : no ~M costovertebral angle tenderness [Urethral Meatus] : meatus normal [Urinary Bladder Findings] : the bladder was normal on palpation [Scrotum] : the scrotum was normal [Testes Tenderness] : no tenderness of the testes [Testes Mass (___cm)] : there were no testicular masses [Prostate Tenderness] : the prostate was not tender [No Prostate Nodules] : no prostate nodules [Prostate Enlarged] : was enlarged [FreeTextEntry1] : MICHOACANO done 7/2020. [] : no respiratory distress [Respiration, Rhythm And Depth] : normal respiratory rhythm and effort [Exaggerated Use Of Accessory Muscles For Inspiration] : no accessory muscle use

## 2020-10-09 NOTE — ASSESSMENT
[FreeTextEntry1] : Urinary symptoms have improved. Continue Flomax and finasteride. He will followup in about one month for repeat cystoscopy. Repeat urine culture was negative.\par \par Stew Yoon MD, FACS\par [facility]\par  of Urology\par \par [address]\[place] [address]\par \par [address]\[place] [address]\par \par Tel: [phone]\par Fax: [phone]

## 2020-10-09 NOTE — LETTER BODY
[Dear  ___] : Dear  [unfilled], [Consult Letter:] : I had the pleasure of evaluating your patient, [unfilled]. [Consult Closing:] : Thank you very much for allowing me to participate in the care of this patient.  If you have any questions, please do not hesitate to contact me. [FreeTextEntry1] : [facility]\par [address] \[place], [address] \par [phone] \par

## 2020-10-09 NOTE — HISTORY OF PRESENT ILLNESS
[FreeTextEntry1] : He is a 69-year-old man who is seen today in follow-up. Urinary symptoms have improved. He has used Pyridium on an off. He has not seen any blood in the urine recently. Repeat urine culture was negative. Culture in September 2020 showed Candida and he was treated with Diflucan. He is on finasteride. He has a distal urethral stricture. Uroflow showed maximum 11, average 6 mL per second, voided 170 cc and residual urine was 50 cc.  He underwent anterior bladder lesion resection on August 2020. 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 He is on finasteride and Flomax twice a day. \par \par About 2 weeks after the catheter was removed he developed gross hematuria and [place] [facility] [place] catheterization and bladder irrigation overnight. He is on Plavix. CT scan on August 23, 2020 from emergency room showed bladder filling defects dependently representing blood clots, mild bilateral hydronephrosis and distended bladder, diverticulosis, gallstones. Prior to surgery urine cytology was normal. CT on 6/2020 from [facility] showed right bladder wall thickening, large prostate and bilateral renal stones.\par \par Previous notes: He has long-standing history of urological issues. He quit smoking more than 40 years ago. He has previous history of elevated PSA level and has undergone prostate biopsy. Also he has passed 2 kidney stones. He is on Flomax and finasteride. In June 2020, urine culture was negative, creatinine 0.64. PSA level was 1.6 in December 2019. \par Addendum: CT scan without contrast in the Nov 2017 showed bilateral simple renal cysts, no kidney stones, enlarged prostate. CT scan in January 2014 shows bilateral renal cysts and previously seen left UPJ stone was no longer visible. Ultrasound in August 2013 showed small left UVJ stone. Studies were done at [facility].

## 2020-11-13 ENCOUNTER — APPOINTMENT (OUTPATIENT)
Dept: UROLOGY | Facility: CLINIC | Age: 70
End: 2020-11-13
Payer: MEDICARE

## 2020-11-13 ENCOUNTER — APPOINTMENT (OUTPATIENT)
Dept: UROLOGY | Facility: CLINIC | Age: 70
End: 2020-11-13

## 2020-11-13 VITALS
DIASTOLIC BLOOD PRESSURE: 86 MMHG | SYSTOLIC BLOOD PRESSURE: 159 MMHG | HEART RATE: 68 BPM | RESPIRATION RATE: 16 BRPM | TEMPERATURE: 97.1 F

## 2020-11-13 DIAGNOSIS — D49.4 NEOPLASM OF UNSPECIFIED BEHAVIOR OF BLADDER: ICD-10-CM

## 2020-11-13 PROCEDURE — 99072 ADDL SUPL MATRL&STAF TM PHE: CPT

## 2020-11-13 PROCEDURE — 52281 CYSTOSCOPY AND TREATMENT: CPT

## 2020-11-15 LAB — BACTERIA UR CULT: NORMAL

## 2020-11-16 LAB — URINE CYTOLOGY: NORMAL

## 2021-02-05 ENCOUNTER — APPOINTMENT (OUTPATIENT)
Dept: UROLOGY | Facility: CLINIC | Age: 71
End: 2021-02-05

## 2022-03-10 NOTE — PATIENT PROFILE ADULT - INFORMATION PROVIDED TO:
March 11, 2022      [place] SUAREZ Philip  [address] [place] [address]        Dear ,    We are writing to inform you of your test results.    Your test results fall within the expected range(s) or remain unchanged from previous results.  Please continue with current treatment plan.    Resulted Orders   GLUCOSE   Result Value Ref Range    Glucose 98 70 - 105 mg/dL    Patient Fasting > 8hrs? Unknown    Lipid Panel   Result Value Ref Range    Cholesterol 150 <200 mg/dL    Triglycerides 89 <150 mg/dL    Direct Measure HDL 42 23 - 92 mg/dL    LDL Cholesterol Calculated 90 <=100 mg/dL    Non HDL Cholesterol 108 <130 mg/dL    Patient Fasting > 8hrs? Unknown     Narrative    Cholesterol  Desirable:  <200 mg/dL    Triglycerides  Normal:  Less than 150 mg/dL  Borderline High:  150-199 mg/dL  High:  200-499 mg/dL  Very High:  Greater than or equal to 500 mg/dL    Direct Measure HDL  Female:  Greater than or equal to 50 mg/dL   Male:  Greater than or equal to 40 mg/dL    LDL Cholesterol  Desirable:  <100mg/dL  Above Desirable:  100-129 mg/dL   Borderline High:  130-159 mg/dL   High:  160-189 mg/dL   Very High:  >= 190 mg/dL    Non HDL Cholesterol  Desirable:  130 mg/dL  Above Desirable:  130-159 mg/dL  Borderline High:  160-189 mg/dL  High:  190-219 mg/dL  Very High:  Greater than or equal to 220 mg/dL   PSA Screen GH   Result Value Ref Range    Prostate Specific Antigen Screen 1.41 0.00 - 4.00 ug/L    Narrative    The DXI Access PSAS WHO assay is a two site immunoenzymatic   assay. Assay values obtained with different assay methods cannot be used   interchangeably due to differences in assay methods and reagent specificity.       If you have any questions or concerns, please call the clinic at the number listed above.       Sincerely,      Srinivasa Ambrose MD          
patient

## 2023-06-14 NOTE — H&P PST ADULT - BREASTS
not examined Ivermectin Pregnancy And Lactation Text: This medication is Pregnancy Category C and it isn't known if it is safe during pregnancy. It is also excreted in breast milk.